# Patient Record
Sex: FEMALE | Race: WHITE | NOT HISPANIC OR LATINO | Employment: FULL TIME | URBAN - METROPOLITAN AREA
[De-identification: names, ages, dates, MRNs, and addresses within clinical notes are randomized per-mention and may not be internally consistent; named-entity substitution may affect disease eponyms.]

---

## 2021-04-13 ENCOUNTER — TELEPHONE (OUTPATIENT)
Dept: CARDIOLOGY | Facility: MEDICAL CENTER | Age: 51
End: 2021-04-13

## 2021-04-13 NOTE — TELEPHONE ENCOUNTER
Spoke w/ pt regarding upcoming appt w/ [facility] @ [facility]. Pt was admitted at [facility] and had several cardiac tests including an echo, EKG, holter monitor, chest X-ray and labs.   I called [facility] med records and verbally requested these records be sent to [facility] @ [phone].

## 2021-04-14 ENCOUNTER — OFFICE VISIT (OUTPATIENT)
Dept: CARDIOLOGY | Facility: MEDICAL CENTER | Age: 51
End: 2021-04-14
Payer: COMMERCIAL

## 2021-04-14 ENCOUNTER — TELEPHONE (OUTPATIENT)
Dept: CARDIOLOGY | Facility: MEDICAL CENTER | Age: 51
End: 2021-04-14

## 2021-04-14 VITALS
DIASTOLIC BLOOD PRESSURE: 78 MMHG | SYSTOLIC BLOOD PRESSURE: 122 MMHG | HEIGHT: 62 IN | WEIGHT: 202 LBS | HEART RATE: 108 BPM | OXYGEN SATURATION: 91 % | BODY MASS INDEX: 37.17 KG/M2

## 2021-04-14 DIAGNOSIS — I10 ESSENTIAL HYPERTENSION, BENIGN: ICD-10-CM

## 2021-04-14 DIAGNOSIS — R00.2 PALPITATIONS: ICD-10-CM

## 2021-04-14 DIAGNOSIS — F17.201 TOBACCO ABUSE, IN REMISSION: ICD-10-CM

## 2021-04-14 DIAGNOSIS — R07.9 CHEST PAIN, UNSPECIFIED TYPE: ICD-10-CM

## 2021-04-14 DIAGNOSIS — I35.1 AORTIC VALVE INSUFFICIENCY, ETIOLOGY OF CARDIAC VALVE DISEASE UNSPECIFIED: ICD-10-CM

## 2021-04-14 DIAGNOSIS — I49.3 PVC (PREMATURE VENTRICULAR CONTRACTION): ICD-10-CM

## 2021-04-14 DIAGNOSIS — Z91.89 10 YEAR RISK OF MI OR STROKE < 7.5%: ICD-10-CM

## 2021-04-14 PROBLEM — J45.50 SEVERE PERSISTENT ASTHMA WITHOUT COMPLICATION: Status: ACTIVE | Noted: 2021-04-14

## 2021-04-14 PROBLEM — J44.9 COPD (CHRONIC OBSTRUCTIVE PULMONARY DISEASE) (HCC): Status: ACTIVE | Noted: 2021-04-14

## 2021-04-14 LAB — EKG IMPRESSION: NORMAL

## 2021-04-14 PROCEDURE — 93000 ELECTROCARDIOGRAM COMPLETE: CPT | Performed by: INTERNAL MEDICINE

## 2021-04-14 PROCEDURE — 99204 OFFICE O/P NEW MOD 45 MIN: CPT | Mod: 25 | Performed by: INTERNAL MEDICINE

## 2021-04-14 RX ORDER — VENLAFAXINE HYDROCHLORIDE 37.5 MG/1
37.5 CAPSULE, EXTENDED RELEASE ORAL DAILY
COMMUNITY
Start: 2021-04-06 | End: 2022-12-21

## 2021-04-14 RX ORDER — HYDROXYZINE PAMOATE 50 MG/1
50 CAPSULE ORAL 4 TIMES DAILY PRN
COMMUNITY
Start: 2021-04-06

## 2021-04-14 RX ORDER — IPRATROPIUM BROMIDE 21 UG/1
SPRAY, METERED NASAL PRN
COMMUNITY
Start: 2021-02-05 | End: 2022-12-21

## 2021-04-14 RX ORDER — LOSARTAN POTASSIUM 50 MG/1
50 TABLET ORAL DAILY
COMMUNITY

## 2021-04-14 RX ORDER — METOPROLOL SUCCINATE 25 MG/1
25 TABLET, EXTENDED RELEASE ORAL DAILY
Qty: 30 TABLET | Refills: 3 | Status: SHIPPED | OUTPATIENT
Start: 2021-04-14 | End: 2021-08-02

## 2021-04-14 RX ORDER — ALBUTEROL SULFATE 90 UG/1
AEROSOL, METERED RESPIRATORY (INHALATION) EVERY 6 HOURS PRN
COMMUNITY
Start: 2021-03-25

## 2021-04-14 ASSESSMENT — ENCOUNTER SYMPTOMS
PALPITATIONS: 1
FEVER: 0
HEARTBURN: 0
COUGH: 0
BACK PAIN: 0
DEPRESSION: 0
CONSTIPATION: 0
FLANK PAIN: 0
DYSPNEA ON EXERTION: 0
DECREASED APPETITE: 0
NAUSEA: 0
PND: 0
WEIGHT GAIN: 0
ABDOMINAL PAIN: 0
DIZZINESS: 0
IRREGULAR HEARTBEAT: 0
NEAR-SYNCOPE: 0
SHORTNESS OF BREATH: 0
CLAUDICATION: 0
ORTHOPNEA: 0
SYNCOPE: 0
WEIGHT LOSS: 0
DIARRHEA: 0
ALTERED MENTAL STATUS: 0
VOMITING: 0
BLURRED VISION: 0

## 2021-04-14 NOTE — PROGRESS NOTES
Cardiology Note    Chief Complaint   Patient presents with   • Chest Pain   • Palpitations       History of Present Illness: Katiana Simmons is a 50 y.o. female PMH HTN, tobacco use, severe reactive airway disease and COPD on PFTs who presents for initial visit.    ED 3/3/21 visit for palpitations [facility]. Found hypertensive 180/107 and sinus tachy as well as hypoxia 90-91%. ddimer was elevated but CTA negative for PE. Improved with BP control using nitroglycerin and IV metoprolol. Troponin negative at the time. Case was discussed with cardiology Dr Davis at that point.     Now still with palpitations. Happen almost daily, but vary in frequency. Lasts about 2-3 minutes. Decreased caffeine intake. Some improvement with this change. However, was still drinking black tea. Has since went to just water and not very much difference. With palpitations will sometimes get chest pain. When presented to ED felt chest pressure, substernal. She also has exertional dyspnea. However this will come and go; some days no dyspnea. In the past, this improved with inhaler use. Remains off tobacco after successfully quitting 1 month ago. Stopped chantix now that quit.     Sister with MI in 40s s/p PCI.     Review of Systems   Constitution: Negative for decreased appetite, fever, malaise/fatigue, weight gain and weight loss.   HENT: Negative for congestion and nosebleeds.    Eyes: Negative for blurred vision.   Cardiovascular: Positive for palpitations. Negative for chest pain, claudication, dyspnea on exertion, irregular heartbeat, leg swelling, near-syncope, orthopnea, paroxysmal nocturnal dyspnea and syncope.   Respiratory: Negative for cough and shortness of breath.    Endocrine: Negative for cold intolerance and heat intolerance.   Skin: Negative for rash.   Musculoskeletal: Negative for back pain.   Gastrointestinal: Negative for abdominal pain, constipation, diarrhea, heartburn, melena, nausea and vomiting.   Genitourinary: Negative for  dysuria, flank pain and hematuria.   Neurological: Negative for dizziness.   Psychiatric/Behavioral: Negative for altered mental status and depression.         History reviewed. No pertinent past medical history.      History reviewed. No pertinent surgical history.      Current Outpatient Medications   Medication Sig Dispense Refill   • albuterol 108 (90 Base) MCG/ACT Aero Soln inhalation aerosol every 6 hours as needed.     • venlafaxine XR (EFFEXOR XR) 37.5 MG CAPSULE SR 24 HR Take 37.5 mg by mouth every day.     • hydrOXYzine pamoate (VISTARIL) 50 MG Cap Take 50 mg by mouth 4 times a day as needed.     • ipratropium (ATROVENT) 0.03 % Solution Administer  into affected nostril(S) as needed.     • losartan (COZAAR) 50 MG Tab Take 50 mg by mouth every day.     • ipratropium (ATROVENT) 0.02 % Solution Take  by nebulization.     • metoprolol SR (TOPROL XL) 25 MG TABLET SR 24 HR Take 1 tablet by mouth every day. 30 tablet 3     No current facility-administered medications for this visit.         Allergies   Allergen Reactions   • Lisinopril Unspecified     Turned red, bp increased rapidly         Family History   Problem Relation Age of Onset   • Hyperlipidemia Father    • Heart Disease Sister          Social History     Socioeconomic History   • Marital status: Single     Spouse name: Not on file   • Number of children: Not on file   • Years of education: Not on file   • Highest education level: Not on file   Occupational History   • Not on file   Tobacco Use   • Smoking status: Former Smoker     Packs/day: 1.00     Years: 20.00     Pack years: 20.00     Quit date: 3/14/2021     Years since quittin.0   • Smokeless tobacco: Never Used   • Tobacco comment: quit on and off, most recently quit 2021   Substance and Sexual Activity   • Alcohol use: Not Currently   • Drug use: Not on file   • Sexual activity: Not on file   Other Topics Concern   • Not on file   Social History Narrative   • Not on file     Social  "Determinants of Health     Financial Resource Strain:    • Difficulty of Paying Living Expenses:    Food Insecurity:    • Worried About Running Out of Food in the Last Year:    • Ran Out of Food in the Last Year:    Transportation Needs:    • Lack of Transportation (Medical):    • Lack of Transportation (Non-Medical):    Physical Activity:    • Days of Exercise per Week:    • Minutes of Exercise per Session:    Stress:    • Feeling of Stress :    Social Connections:    • Frequency of Communication with Friends and Family:    • Frequency of Social Gatherings with Friends and Family:    • Attends Baptism Services:    • Active Member of Clubs or Organizations:    • Attends Club or Organization Meetings:    • Marital Status:    Intimate Partner Violence:    • Fear of Current or Ex-Partner:    • Emotionally Abused:    • Physically Abused:    • Sexually Abused:          Physical Exam:  Ambulatory Vitals  /78 (BP Location: Left arm, Patient Position: Sitting, BP Cuff Size: Adult)   Pulse (!) 108   Ht 1.575 m (5' 2\")   Wt 91.6 kg (202 lb)   SpO2 91%    BP Readings from Last 4 Encounters:   04/14/21 122/78     Weight/BMI:   Vitals:    04/14/21 1034   BP: 122/78   Weight: 91.6 kg (202 lb)   Height: 1.575 m (5' 2\")    Body mass index is 36.95 kg/m².  Wt Readings from Last 4 Encounters:   04/14/21 91.6 kg (202 lb)       Physical Exam   Constitutional: She is oriented to person, place, and time and well-developed, well-nourished, and in no distress. No distress.   HENT:   Head: Normocephalic and atraumatic.   Eyes: Pupils are equal, round, and reactive to light. Conjunctivae are normal.   Neck: No JVD present.   Cardiovascular: Normal rate, regular rhythm, normal heart sounds and intact distal pulses. Exam reveals no gallop and no friction rub.   No murmur heard.  Pulmonary/Chest: Effort normal and breath sounds normal. No respiratory distress. She has no wheezes. She has no rales. She exhibits no tenderness. "   Abdominal: Soft. Bowel sounds are normal. She exhibits no distension.   Musculoskeletal:         General: No edema.      Cervical back: Normal range of motion and neck supple.   Neurological: She is alert and oriented to person, place, and time.   Skin: Skin is warm and dry.   Psychiatric: Affect and judgment normal.       Lab Data Review:  No results found for: CHOLSTRLTOT, LDL, HDL, TRIGLYCERIDE    No results found for: SODIUM, POTASSIUM, CHLORIDE, CO2, GLUCOSE, BUN, CREATININE, BUNCREATRAT, GLOMRATE  CrCl cannot be calculated (No successful lab value found.).  No results found for: ALKPHOSPHAT, ASTSGOT, ALTSGPT, TBILIRUBIN   No results found for: WBC, HCT  No results found for: HBA1C  No components found for: TROP    Outside labs [facility] [place]  -tot chol 173, trig 107, HDL 47,     Cardiac Imaging and Procedures Review:      EKG outside study [facility] 4/1/2021 - sinus, frequent PVCs    TTE 3/4/2021 [facility] [place] [facility]  -normal LV, normal RV  -mild to mod AR [ probably closer to moderate]  -estimated RVSP 40mmHg    holter 2/4/21 24h [facility]  -ventricular ectopy 4.9%  -rare supraventricular ectopy  -predominantly sinus rhythm    Medical Decision Making:  Problem List Items Addressed This Visit     10 year risk of MI or stroke < 7.5%    Essential hypertension, benign    Relevant Medications    losartan (COZAAR) 50 MG Tab    metoprolol SR (TOPROL XL) 25 MG TABLET SR 24 HR    Other Relevant Orders    OVERNIGHT PULSE OXIMETRY    Tobacco abuse, in remission    Palpitations    Relevant Orders    EKG (Completed)    Chest pain    Relevant Orders    EKG (Completed)    CT-CTA HEART W/3D IMAGE    PVC (premature ventricular contraction)    Relevant Medications    losartan (COZAAR) 50 MG Tab    metoprolol SR (TOPROL XL) 25 MG TABLET SR 24 HR    Other Relevant Orders    OVERNIGHT PULSE OXIMETRY    Aortic regurgitation    Relevant Medications    losartan (COZAAR) 50 MG Tab    metoprolol SR (TOPROL XL) 25 MG TABLET SR 24 HR        Chest pain /  dyspnea - unclear etiology; cardiac vs pulm. Could be related to PVCs. Attempt BB. Check CCTA evaluate coronaries. BP control. Could also be related to pulmonary etiologies asthma/copd.     PVCs / palpitations - add metoprolol. Check OPO.     AR - BP control. Thereafter will repeat TTE.    It was my pleasure to meet with Ms. Simmons.

## 2021-04-14 NOTE — PATIENT INSTRUCTIONS
Metoprolol extended-release tablets  What is this medicine?  METOPROLOL (me TOE proe lole) is a beta-blocker. Beta-blockers reduce the workload on the heart and help it to beat more regularly. This medicine is used to treat high blood pressure and to prevent chest pain. It is also used to after a heart attack and to prevent an additional heart attack from occurring.  This medicine may be used for other purposes; ask your health care provider or pharmacist if you have questions.  COMMON BRAND NAME(S): toprol, Toprol XL  What should I tell my health care provider before I take this medicine?  They need to know if you have any of these conditions:  · diabetes  · heart or vessel disease like slow heart rate, worsening heart failure, heart block, sick sinus syndrome or Raynaud's disease  · kidney disease  · liver disease  · lung or breathing disease, like asthma or emphysema  · pheochromocytoma  · thyroid disease  · an unusual or allergic reaction to metoprolol, other beta-blockers, medicines, foods, dyes, or preservatives  · pregnant or trying to get pregnant  · breast-feeding  How should I use this medicine?  Take this medicine by mouth with a glass of water. Follow the directions on the prescription label. Do not crush or chew. Take this medicine with or immediately after meals. Take your doses at regular intervals. Do not take more medicine than directed. Do not stop taking this medicine suddenly. This could lead to serious heart-related effects.  Talk to your pediatrician regarding the use of this medicine in children. While this drug may be prescribed for children as young as 6 years for selected conditions, precautions do apply.  Overdosage: If you think you have taken too much of this medicine contact a poison control center or emergency room at once.  NOTE: This medicine is only for you. Do not share this medicine with others.  What if I miss a dose?  If you miss a dose, take it as soon as you can. If it is  almost time for your next dose, take only that dose. Do not take double or extra doses.  What may interact with this medicine?  This medicine may interact with the following medications:  · certain medicines for blood pressure, heart disease, irregular heart beat  · certain medicines for depression, like monoamine oxidase (MAO) inhibitors, fluoxetine, or paroxetine  · clonidine  · dobutamine  · epinephrine  · isoproterenol  · reserpine  This list may not describe all possible interactions. Give your health care provider a list of all the medicines, herbs, non-prescription drugs, or dietary supplements you use. Also tell them if you smoke, drink alcohol, or use illegal drugs. Some items may interact with your medicine.  What should I watch for while using this medicine?  Visit your doctor or health care professional for regular check ups. Contact your doctor right away if your symptoms worsen. Check your blood pressure and pulse rate regularly. Ask your health care professional what your blood pressure and pulse rate should be, and when you should contact them.  You may get drowsy or dizzy. Do not drive, use machinery, or do anything that needs mental alertness until you know how this medicine affects you. Do not sit or stand up quickly, especially if you are an older patient. This reduces the risk of dizzy or fainting spells. Contact your doctor if these symptoms continue. Alcohol may interfere with the effect of this medicine. Avoid alcoholic drinks.  This medicine may increase blood sugar. Ask your healthcare provider if changes in diet or medicines are needed if you have diabetes.  What side effects may I notice from receiving this medicine?  Side effects that you should report to your doctor or health care professional as soon as possible:  · allergic reactions like skin rash, itching or hives  · cold or numb hands or feet  · depression  · difficulty breathing  · faint  · fever with sore throat  · irregular  "heartbeat, chest pain  · rapid weight gain  ·   signs and symptoms of high blood sugar such as being more thirsty or hungry or having to urinate more than normal. You may also feel very tired or have blurry vision.  · swollen legs or ankles  Side effects that usually do not require medical attention (report to your doctor or health care professional if they continue or are bothersome):  · anxiety or nervousness  · change in sex drive or performance  · dry skin  · headache  · nightmares or trouble sleeping  · short term memory loss  · stomach upset or diarrhea  This list may not describe all possible side effects. Call your doctor for medical advice about side effects. You may report side effects to FDA at 6-073-HKR-0474.  Where should I keep my medicine?  Keep out of the reach of children.  Store at room temperature between 15 and 30 degrees C (59 and 86 degrees F). Throw away any unused medicine after the expiration date.  NOTE: This sheet is a summary. It may not cover all possible information. If you have questions about this medicine, talk to your doctor, pharmacist, or health care provider.  © 2020 Elsevier/Gold Standard (2019-10-08 11:09:41)      Hypertension, Adult  High blood pressure (hypertension) is when the force of blood pumping through the arteries is too strong. The arteries are the blood vessels that carry blood from the heart throughout the body. Hypertension forces the heart to work harder to pump blood and may cause arteries to become narrow or stiff. Untreated or uncontrolled hypertension can cause a heart attack, heart failure, a stroke, kidney disease, and other problems.  A blood pressure reading consists of a higher number over a lower number. Ideally, your blood pressure should be below 120/80. The first (\"top\") number is called the systolic pressure. It is a measure of the pressure in your arteries as your heart beats. The second (\"bottom\") number is called the diastolic pressure. It is a " measure of the pressure in your arteries as the heart relaxes.  What are the causes?  The exact cause of this condition is not known. There are some conditions that result in or are related to high blood pressure.  What increases the risk?  Some risk factors for high blood pressure are under your control. The following factors may make you more likely to develop this condition:  · Smoking.  · Having type 2 diabetes mellitus, high cholesterol, or both.  · Not getting enough exercise or physical activity.  · Being overweight.  · Having too much fat, sugar, calories, or salt (sodium) in your diet.  · Drinking too much alcohol.  Some risk factors for high blood pressure may be difficult or impossible to change. Some of these factors include:  · Having chronic kidney disease.  · Having a family history of high blood pressure.  · Age. Risk increases with age.  · Race. You may be at higher risk if you are .  · Gender. Men are at higher risk than women before age 45. After age 65, women are at higher risk than men.  · Having obstructive sleep apnea.  · Stress.  What are the signs or symptoms?  High blood pressure may not cause symptoms. Very high blood pressure (hypertensive crisis) may cause:  · Headache.  · Anxiety.  · Shortness of breath.  · Nosebleed.  · Nausea and vomiting.  · Vision changes.  · Severe chest pain.  · Seizures.  How is this diagnosed?  This condition is diagnosed by measuring your blood pressure while you are seated, with your arm resting on a flat surface, your legs uncrossed, and your feet flat on the floor. The cuff of the blood pressure monitor will be placed directly against the skin of your upper arm at the level of your heart. It should be measured at least twice using the same arm. Certain conditions can cause a difference in blood pressure between your right and left arms.  Certain factors can cause blood pressure readings to be lower or higher than normal for a short period of  time:  · When your blood pressure is higher when you are in a health care provider's office than when you are at home, this is called white coat hypertension. Most people with this condition do not need medicines.  · When your blood pressure is higher at home than when you are in a health care provider's office, this is called masked hypertension. Most people with this condition may need medicines to control blood pressure.  If you have a high blood pressure reading during one visit or you have normal blood pressure with other risk factors, you may be asked to:  · Return on a different day to have your blood pressure checked again.  · Monitor your blood pressure at home for 1 week or longer.  If you are diagnosed with hypertension, you may have other blood or imaging tests to help your health care provider understand your overall risk for other conditions.  How is this treated?  This condition is treated by making healthy lifestyle changes, such as eating healthy foods, exercising more, and reducing your alcohol intake. Your health care provider may prescribe medicine if lifestyle changes are not enough to get your blood pressure under control, and if:  · Your systolic blood pressure is above 130.  · Your diastolic blood pressure is above 80.  Your personal target blood pressure may vary depending on your medical conditions, your age, and other factors.  Follow these instructions at home:  Eating and drinking    · Eat a diet that is high in fiber and potassium, and low in sodium, added sugar, and fat. An example eating plan is called the DASH (Dietary Approaches to Stop Hypertension) diet. To eat this way:  ? Eat plenty of fresh fruits and vegetables. Try to fill one half of your plate at each meal with fruits and vegetables.  ? Eat whole grains, such as whole-wheat pasta, brown rice, or whole-grain bread. Fill about one fourth of your plate with whole grains.  ? Eat or drink low-fat dairy products, such as skim  milk or low-fat yogurt.  ? Avoid fatty cuts of meat, processed or cured meats, and poultry with skin. Fill about one fourth of your plate with lean proteins, such as fish, chicken without skin, beans, eggs, or tofu.  ? Avoid pre-made and processed foods. These tend to be higher in sodium, added sugar, and fat.  · Reduce your daily sodium intake. Most people with hypertension should eat less than 1,500 mg of sodium a day.  · Do not drink alcohol if:  ? Your health care provider tells you not to drink.  ? You are pregnant, may be pregnant, or are planning to become pregnant.  · If you drink alcohol:  ? Limit how much you use to:  § 0-1 drink a day for women.  § 0-2 drinks a day for men.  ? Be aware of how much alcohol is in your drink. In the [place] one drink equals one 12 oz bottle of beer (355 mL), one 5 oz glass of wine (148 mL), or one 1½ oz glass of hard liquor (44 mL).  Lifestyle    · Work with your health care provider to maintain a healthy body weight or to lose weight. Ask what an ideal weight is for you.  · Get at least 30 minutes of exercise most days of the week. Activities may include walking, swimming, or biking.  · Include exercise to strengthen your muscles (resistance exercise), such as Pilates or lifting weights, as part of your weekly exercise routine. Try to do these types of exercises for 30 minutes at least 3 days a week.  · Do not use any products that contain nicotine or tobacco, such as cigarettes, e-cigarettes, and chewing tobacco. If you need help quitting, ask your health care provider.  · Monitor your blood pressure at home as told by your health care provider.  · Keep all follow-up visits as told by your health care provider. This is important.  Medicines  · Take over-the-counter and prescription medicines only as told by your health care provider. Follow directions carefully. Blood pressure medicines must be taken as prescribed.  · Do not skip doses of blood pressure medicine. Doing this  puts you at risk for problems and can make the medicine less effective.  · Ask your health care provider about side effects or reactions to medicines that you should watch for.  Contact a health care provider if you:  · Think you are having a reaction to a medicine you are taking.  · Have headaches that keep coming back (recurring).  · Feel dizzy.  · Have swelling in your ankles.  · Have trouble with your vision.  Get help right away if you:  · Develop a severe headache or confusion.  · Have unusual weakness or numbness.  · Feel faint.  · Have severe pain in your chest or abdomen.  · Vomit repeatedly.  · Have trouble breathing.  Summary  · Hypertension is when the force of blood pumping through your arteries is too strong. If this condition is not controlled, it may put you at risk for serious complications.  · Your personal target blood pressure may vary depending on your medical conditions, your age, and other factors. For most people, a normal blood pressure is less than 120/80.  · Hypertension is treated with lifestyle changes, medicines, or a combination of both. Lifestyle changes include losing weight, eating a healthy, low-sodium diet, exercising more, and limiting alcohol.  This information is not intended to replace advice given to you by your health care provider. Make sure you discuss any questions you have with your health care provider.  Document Released: 12/18/2006 Document Revised: 08/28/2019 Document Reviewed: 08/28/2019  Elsevier Patient Education © 2020 Elsevier Inc.

## 2021-04-14 NOTE — TELEPHONE ENCOUNTER
OPO order per [facility]     Faxed to [facility] w/ faceshet ID/INS cards & office visit note.   T: [phone]  F: [phone]  INS:[facility]    Fax confirmed-sent to scanning.

## 2021-05-07 ENCOUNTER — TELEPHONE (OUTPATIENT)
Dept: CARDIOLOGY | Facility: MEDICAL CENTER | Age: 51
End: 2021-05-07

## 2021-05-07 NOTE — TELEPHONE ENCOUNTER
Sav Freedman M.D.   5/6/2021 10:24 PM PDT      Significant desaturation events. Amarilis, would you please forward to PMD Dr Salguero? Not on mychart. Thank you!     Called pt. She explains that she actually already completed an OPO for her PCP Dr. Salguero not too long prior to the OPO she did for [facility]. She assumed the reason for the repeat test was because the OPO for her PCP shut off [facility] through the night. She thought we wanted her to redo it so we had the full night's results. Her PCP already prescribed her nocturnal oxygen based on the first OPO results, which she started a couple days before the OPO for [facility]. She didn't wear the O2 during the OPO, though. Apologized for the miscommunication, and faxed the updated OPO results to her PCP at [phone]. Receipt confirmed.

## 2021-07-28 ENCOUNTER — PATIENT MESSAGE (OUTPATIENT)
Dept: CARDIOLOGY | Facility: MEDICAL CENTER | Age: 51
End: 2021-07-28

## 2021-07-29 NOTE — PATIENT COMMUNICATION
Teams message to JAQUELIN Kee. Recommendations are to hold albuterol prior to CCTA if possible due to the possibility of increased HR. Pt should bring it with her in case she needs it after. No contraindication to ipratropium.

## 2021-08-09 ENCOUNTER — HOSPITAL ENCOUNTER (OUTPATIENT)
Dept: RADIOLOGY | Facility: MEDICAL CENTER | Age: 51
End: 2021-08-09
Attending: INTERNAL MEDICINE
Payer: COMMERCIAL

## 2021-08-09 DIAGNOSIS — R07.9 CHEST PAIN, UNSPECIFIED TYPE: ICD-10-CM

## 2021-08-09 PROCEDURE — A9270 NON-COVERED ITEM OR SERVICE: HCPCS | Performed by: RADIOLOGY

## 2021-08-09 PROCEDURE — 75574 CT ANGIO HRT W/3D IMAGE: CPT

## 2021-08-09 PROCEDURE — 700117 HCHG RX CONTRAST REV CODE 255: Performed by: INTERNAL MEDICINE

## 2021-08-09 PROCEDURE — 700102 HCHG RX REV CODE 250 W/ 637 OVERRIDE(OP): Performed by: RADIOLOGY

## 2021-08-09 RX ORDER — NITROGLYCERIN 0.4 MG/1
0.4 TABLET SUBLINGUAL
Status: DISCONTINUED | OUTPATIENT
Start: 2021-08-09 | End: 2021-08-10 | Stop reason: HOSPADM

## 2021-08-09 RX ADMIN — IOHEXOL 60 ML: 350 INJECTION, SOLUTION INTRAVENOUS at 10:47

## 2021-08-09 RX ADMIN — NITROGLYCERIN 0.4 MG: 0.4 TABLET, ORALLY DISINTEGRATING SUBLINGUAL at 10:13

## 2021-08-09 NOTE — PROGRESS NOTES
CTA EXAMINATION:      Patient had CTA.  Patient brought to preparation room.   Verbal consent given by patient for PIV and administration of NTG medications by RN.  PIV initiated, 20 L AC  Review of medications, protocol, and NPO status.   Education provided to patient regarding examination.    Patient given baseline 3 lead EK  HR: SR 77 w/PVCs  BP: 146/102  RR 20  96% RA     Patient ready for CT scan  Vitals:1005  BP:138/89   HR: SR 85 w/PVCs  RR22  96% RA    Administration of Sublingual NTG given per CTA protocol at 1013--See MAR  Vitals: 1013  BP:129/73  HR: SR 89  w/PVCs  RR 22   96% RA    Vitals: 1025  BP:122/64  HR: SR 94 w/PVCs  RR20  93% RA    Patient returned to preparation area where post procedure education given. PIV removed, patient provided 2 bottles of water.     Vitals returned to baseline.. Patient stated ready to go back. Discharge education provided. Discharged per protocol.     Patient ambulated self, escorted by RN to [facility].

## 2021-08-12 ENCOUNTER — OFFICE VISIT (OUTPATIENT)
Dept: CARDIOLOGY | Facility: MEDICAL CENTER | Age: 51
End: 2021-08-12
Payer: COMMERCIAL

## 2021-08-12 VITALS
HEART RATE: 96 BPM | RESPIRATION RATE: 16 BRPM | OXYGEN SATURATION: 94 % | WEIGHT: 213.7 LBS | BODY MASS INDEX: 39.32 KG/M2 | DIASTOLIC BLOOD PRESSURE: 72 MMHG | SYSTOLIC BLOOD PRESSURE: 110 MMHG | HEIGHT: 62 IN

## 2021-08-12 DIAGNOSIS — I10 ESSENTIAL HYPERTENSION, BENIGN: ICD-10-CM

## 2021-08-12 DIAGNOSIS — Z91.89 10 YEAR RISK OF MI OR STROKE < 7.5%: ICD-10-CM

## 2021-08-12 DIAGNOSIS — I35.1 AORTIC VALVE INSUFFICIENCY, ETIOLOGY OF CARDIAC VALVE DISEASE UNSPECIFIED: ICD-10-CM

## 2021-08-12 DIAGNOSIS — J44.9 CHRONIC OBSTRUCTIVE PULMONARY DISEASE, UNSPECIFIED COPD TYPE (HCC): ICD-10-CM

## 2021-08-12 DIAGNOSIS — R00.2 PALPITATIONS: ICD-10-CM

## 2021-08-12 PROCEDURE — 99214 OFFICE O/P EST MOD 30 MIN: CPT | Performed by: INTERNAL MEDICINE

## 2021-08-12 RX ORDER — TIOTROPIUM BROMIDE INHALATION SPRAY 1.56 UG/1
SPRAY, METERED RESPIRATORY (INHALATION)
COMMUNITY
Start: 2021-08-05 | End: 2022-12-21

## 2021-08-12 ASSESSMENT — ENCOUNTER SYMPTOMS
PALPITATIONS: 1
COUGH: 0
FLANK PAIN: 0
WEIGHT LOSS: 0
DIZZINESS: 0
BLURRED VISION: 0
HEARTBURN: 0
PND: 0
NAUSEA: 0
WEIGHT GAIN: 0
CLAUDICATION: 0
VOMITING: 0
NEAR-SYNCOPE: 0
ORTHOPNEA: 0
FEVER: 0
DEPRESSION: 0
DIARRHEA: 0
DYSPNEA ON EXERTION: 0
ABDOMINAL PAIN: 0
IRREGULAR HEARTBEAT: 0
DECREASED APPETITE: 0
SHORTNESS OF BREATH: 0
SYNCOPE: 0
ALTERED MENTAL STATUS: 0
BACK PAIN: 0
CONSTIPATION: 0

## 2021-08-12 NOTE — PROGRESS NOTES
Cardiology Note    Chief Complaint   Patient presents with   • Hypertension   • Palpitations       History of Present Illness: Katiana Simmons is a 50 y.o. female PMH HTN, tobacco use, severe reactive airway disease and COPD on PFTs who presents for initial visit.    Diagnosed with copd around 4/2021 she states. Doesn't have pulmonologist yet. Underwent PFTs at [facility]. Found sleep apnea. Pending follow up with sleep medicine. Metoprolol significantly reduced palpitations. Adequate control at 25mg and doesn't want to increase at this time. Continues to refrain from tobacco. Has weight loss plan and is interested in intermittent fasting. She will ask for nutrition referral if needs help.    ED 3/3/21 visit for palpitations [facility]. Found hypertensive 180/107 and sinus tachy as well as hypoxia 90-91%. ddimer was elevated but CTA negative for PE. Improved with BP control using nitroglycerin and IV metoprolol. Troponin negative at the time. Case was discussed with cardiology Dr Davis at that point.     Review of Systems   Constitutional: Negative for decreased appetite, fever, malaise/fatigue, weight gain and weight loss.   HENT: Negative for congestion and nosebleeds.    Eyes: Negative for blurred vision.   Cardiovascular: Positive for palpitations. Negative for chest pain, claudication, dyspnea on exertion, irregular heartbeat, leg swelling, near-syncope, orthopnea, paroxysmal nocturnal dyspnea and syncope.   Respiratory: Negative for cough and shortness of breath.    Endocrine: Negative for cold intolerance and heat intolerance.   Skin: Negative for rash.   Musculoskeletal: Negative for back pain.   Gastrointestinal: Negative for abdominal pain, constipation, diarrhea, heartburn, melena, nausea and vomiting.   Genitourinary: Negative for dysuria, flank pain and hematuria.   Neurological: Negative for dizziness.   Psychiatric/Behavioral: Negative for altered mental status and depression.         History reviewed. No  pertinent past medical history.      History reviewed. No pertinent surgical history.      Current Outpatient Medications   Medication Sig Dispense Refill   • SPIRIVA RESPIMAT 1.25 MCG/ACT Aero Soln Inhale 2 puffs every day by inhalation route.     • metoprolol SR (TOPROL XL) 25 MG TABLET SR 24 HR Take 1 tablet by mouth every day. 90 tablet 2   • albuterol 108 (90 Base) MCG/ACT Aero Soln inhalation aerosol every 6 hours as needed.     • venlafaxine XR (EFFEXOR XR) 37.5 MG CAPSULE SR 24 HR Take 37.5 mg by mouth every day.     • hydrOXYzine pamoate (VISTARIL) 50 MG Cap Take 50 mg by mouth 4 times a day as needed.     • ipratropium (ATROVENT) 0.03 % Solution Administer  into affected nostril(S) as needed.     • losartan (COZAAR) 50 MG Tab Take 50 mg by mouth every day.     • ipratropium (ATROVENT) 0.02 % Solution Take  by nebulization.       No current facility-administered medications for this visit.         Allergies   Allergen Reactions   • Lisinopril Unspecified     Turned red, bp increased rapidly         Family History   Problem Relation Age of Onset   • Hyperlipidemia Father    • Heart Disease Sister          Social History     Socioeconomic History   • Marital status: Single     Spouse name: Not on file   • Number of children: Not on file   • Years of education: Not on file   • Highest education level: Not on file   Occupational History   • Not on file   Tobacco Use   • Smoking status: Former Smoker     Packs/day: 1.00     Years: 20.00     Pack years: 20.00     Quit date: 3/14/2021     Years since quittin.4   • Smokeless tobacco: Never Used   • Tobacco comment: quit on and off, most recently quit 2021   Substance and Sexual Activity   • Alcohol use: Not Currently   • Drug use: Not on file   • Sexual activity: Not on file   Other Topics Concern   • Not on file   Social History Narrative   • Not on file     Social Determinants of Health     Financial Resource Strain:    • Difficulty of Paying Living  "Expenses:    Food Insecurity:    • Worried About Running Out of Food in the Last Year:    • Ran Out of Food in the Last Year:    Transportation Needs:    • Lack of Transportation (Medical):    • Lack of Transportation (Non-Medical):    Physical Activity:    • Days of Exercise per Week:    • Minutes of Exercise per Session:    Stress:    • Feeling of Stress :    Social Connections:    • Frequency of Communication with Friends and Family:    • Frequency of Social Gatherings with Friends and Family:    • Attends Baptism Services:    • Active Member of Clubs or Organizations:    • Attends Club or Organization Meetings:    • Marital Status:    Intimate Partner Violence:    • Fear of Current or Ex-Partner:    • Emotionally Abused:    • Physically Abused:    • Sexually Abused:          Physical Exam:  Ambulatory Vitals  /72 (BP Location: Left arm, Patient Position: Sitting, BP Cuff Size: Adult)   Pulse 96   Resp 16   Ht 1.575 m (5' 2\")   Wt 96.9 kg (213 lb 11.2 oz)   SpO2 94%    BP Readings from Last 4 Encounters:   08/12/21 110/72   04/14/21 122/78     Weight/BMI:   Vitals:    08/12/21 1525   BP: 110/72   Weight: 96.9 kg (213 lb 11.2 oz)   Height: 1.575 m (5' 2\")    Body mass index is 39.09 kg/m².  Wt Readings from Last 4 Encounters:   08/12/21 96.9 kg (213 lb 11.2 oz)   04/14/21 91.6 kg (202 lb)       Physical Exam  Constitutional:       General: She is not in acute distress.  HENT:      Head: Normocephalic and atraumatic.   Eyes:      Conjunctiva/sclera: Conjunctivae normal.      Pupils: Pupils are equal, round, and reactive to light.   Neck:      Vascular: No JVD.   Cardiovascular:      Rate and Rhythm: Normal rate and regular rhythm.      Heart sounds: Normal heart sounds. No murmur heard.   No friction rub. No gallop.    Pulmonary:      Effort: Pulmonary effort is normal. No respiratory distress.      Breath sounds: Normal breath sounds. No wheezing or rales.   Chest:      Chest wall: No tenderness. "   Abdominal:      General: Bowel sounds are normal. There is no distension.      Palpations: Abdomen is soft.   Musculoskeletal:      Cervical back: Normal range of motion and neck supple.   Skin:     General: Skin is warm and dry.   Neurological:      Mental Status: She is alert and oriented to person, place, and time.   Psychiatric:         Mood and Affect: Affect normal.         Judgment: Judgment normal.         Lab Data Review:  No results found for: CHOLSTRLTOT, LDL, HDL, TRIGLYCERIDE    No results found for: SODIUM, POTASSIUM, CHLORIDE, CO2, GLUCOSE, BUN, CREATININE, BUNCREATRAT, GLOMRATE  CrCl cannot be calculated (No successful lab value found.).  No results found for: ALKPHOSPHAT, ASTSGOT, ALTSGPT, TBILIRUBIN   No results found for: WBC, HCT  No results found for: HBA1C  No components found for: TROP    [place] [facility]  [place] chol 173, trig 107, HDL 47,     Cardiac Imaging and Procedures Review:      [place] [facility] [place] - sinus, frequent PVCs    TTE 3/4/20[place] [facility]  [place]normal LV, normal RV  -mild to mod AR [ probably closer to moderate]  -estimated RVSP 40mmHg    holter 2/4/21 24h [facility]  -ventricular ectopy 4.9%  -rare supraventricular ectopy  -predominantly sinus rhythm    CCTA 8/9/21  Coronary calcification:  LMA - 0.0  LCX - 0.0  LAD - 0.0  RCA - 0.0  PDA - 0.0  Total Calcium Score: 0.0  Percentile: Calcium score is below the 25th percentile for the patient's age and sex.  Coronary CTA:  Dominance: Right dominant circulation.  Left Main: Large caliber vessel with a normal takeoff from the left coronary cusp that bifurcates to form a left anterior descending artery and a left circumflex artery. No plaque or stenosis.  LAD and Diagonals: Patent. No plaque or stenosis. No ramus intermedius.  LCX and Obtuse Marginals: Patent. No plaque or stenosis.  RCA, Posterior Descending and Posterolateral Branches: Patent. No plaque or stenosis.  Cardiac Structure and Morphology:  Left atrium:  Normal in size. No thrombus in the left atrial appendage.  Left ventricle: Normal in size. No stigmata of prior infarction. No abnormal filling defect.  Pericardium: Normal thickness. No pericardial effusion or calcification.  Cardiac valves: No thickening or calcifications in the aortic or mitral valves.  Aorta: 3.9 cm ascending aortic ectasia. No aneurysm.  1. Calcium score of 0.  2. No evidence of coronary plaque or coronary artery stenosis.  3. A 3.9 cm ascending aortic ectasia.  4. Scattered areas of atelectasis/scarring throughout the lungs.    Medical Decision Making:  Problem List Items Addressed This Visit     10 year risk of MI or stroke < 7.5%    Essential hypertension, benign    COPD (chronic obstructive pulmonary disease) (HCC)    Relevant Medications    SPIRIVA RESPIMAT 1.25 MCG/ACT Aero Soln    Other Relevant Orders    REFERRAL TO PULMONARY AND SLEEP MEDICINE    Palpitations    Aortic regurgitation    Relevant Orders    EC-ECHOCARDIOGRAM LTD W/O CONT        Dyspnea / copd - normal cardiac testing with 0 coronary calcium. Refer to pulmonary.     PVCs / palpitations - add metoprolol. Follow up with sleep medicine for LEDA.    AR - repeat TTE now that BP controlled.    It was my pleasure to meet with Ms. Simmons.

## 2021-08-12 NOTE — PATIENT INSTRUCTIONS

## 2021-10-26 DIAGNOSIS — I35.1 AORTIC VALVE INSUFFICIENCY, ETIOLOGY OF CARDIAC VALVE DISEASE UNSPECIFIED: ICD-10-CM

## 2022-04-02 ASSESSMENT — ENCOUNTER SYMPTOMS
SHORTNESS OF BREATH: 1
WHEEZING: 1
RESPIRATORY SYMPTOMS COMMENTS: YES
DYSPNEA AT REST: 1
HEMOPTYSIS: 0
CHEST TIGHTNESS: 0

## 2022-04-04 ENCOUNTER — HOSPITAL ENCOUNTER (OUTPATIENT)
Dept: RADIOLOGY | Facility: MEDICAL CENTER | Age: 52
End: 2022-04-04
Payer: COMMERCIAL

## 2022-04-05 ENCOUNTER — OFFICE VISIT (OUTPATIENT)
Dept: SLEEP MEDICINE | Facility: MEDICAL CENTER | Age: 52
End: 2022-04-05
Payer: COMMERCIAL

## 2022-04-05 VITALS
BODY MASS INDEX: 44.01 KG/M2 | WEIGHT: 239.13 LBS | DIASTOLIC BLOOD PRESSURE: 74 MMHG | OXYGEN SATURATION: 93 % | HEART RATE: 91 BPM | SYSTOLIC BLOOD PRESSURE: 110 MMHG | HEIGHT: 62 IN

## 2022-04-05 DIAGNOSIS — R09.02 HYPOXEMIA: ICD-10-CM

## 2022-04-05 DIAGNOSIS — G47.33 OSA (OBSTRUCTIVE SLEEP APNEA): ICD-10-CM

## 2022-04-05 DIAGNOSIS — R06.02 SOB (SHORTNESS OF BREATH): ICD-10-CM

## 2022-04-05 DIAGNOSIS — R63.8 INCREASED BMI: ICD-10-CM

## 2022-04-05 DIAGNOSIS — J44.89 ASTHMA-COPD OVERLAP SYNDROME (HCC): ICD-10-CM

## 2022-04-05 PROCEDURE — 99204 OFFICE O/P NEW MOD 45 MIN: CPT | Performed by: INTERNAL MEDICINE

## 2022-04-05 ASSESSMENT — PATIENT HEALTH QUESTIONNAIRE - PHQ9: CLINICAL INTERPRETATION OF PHQ2 SCORE: 0

## 2022-04-07 NOTE — PROGRESS NOTES
Chief Complaint   Patient presents with   • Establish Care     Referred by Dr Freedman for COPD   • Other     CTA-Chest 03/30/21, CXR 10/2022, echo 10/26/21, OPO 04/28/21       HPI:  The patient is a 51-year-old woman referred from cardiology for evaluation of probable COPD.  The patient smoked about 1 pack of cigarettes per day for 20 years.  She said she smoked on and off.  She finally quit smoking in March 2021.  Patient has chronic shortness of breath with significant activity.  She does not complain of a chronic cough or sputum production.  She has never had hemoptysis.  She does admit to wheezing several times per week.  She has been living in [place] for the past several years and while there she was bothered with seasonal allergies.  She had no history of allergies as a child.  An allergy referral is pending.  She tells me that albuterol has helped her when she gets short of breath and helps her to exercise.  She has been using Trelegy which helps and also uses a nebulizer twice daily.  She had spirometry performed at [facility] demonstrating an FEV1 of 0.84 l which was 32% of predicted.  Her FEV1 FVC ratio was 53%.  After a bronchodilator was administered her FEV1 improved to 1.03 L which was 42% of predicted.  Her flow volume loop seem to indicate good effort.  A chest CT performed at [facility] demonstrated some left linear atelectasis but was otherwise unremarkable.  There was no evidence of pulmonary embolism.  An echocardiogram showed no evidence of right ventricular dysfunction and no evidence of pulmonary hypertension.  Left ventricular function was normal.  Overnight oximetry did show hypoxemia which led to a sleep study.  The patient is now on CPAP at night and is tolerating the CPAP.  She was seen in the emergency room several times for hypertension which is now controlled with losartan and metoprolol.    Past Medical History:   Diagnosis Date   • Asthma    • Bronchitis    • Chickenpox     • Difficulty swallowing    • Hypertension    • Palpitations    • Shortness of breath    • Sleep apnea    • Tonsillitis    • Wears glasses    • Wheezing        ROS:   Constitutional: Denies fevers, chills, night sweats, fatigue or weight loss  Eyes: Denies vision loss, pain, drainage, double vision  Ears, Nose, Throat: Denies earache, tinnitus, hoarseness  Cardiovascular: Denies chest pain, tightness, palpitations at this time  Respiratory: See HPI  Sleep: She is on CPAP  GI: Denies abdominal pain, nausea, vomiting, diarrhea  : Denies frequent urination, hematuria, painful urination  Musculoskeletal: Denies back pain, painful joints, sore muscles  Neurological: Denies headaches, seizures  Skin: Denies rashes, color changes  Psychiatric: Denies depression or thoughts of suicide  Hematologic: Denies bleeding tendency or clotting tendency  Allergic/Immunologic: Denies rhinitis, skin sensitivity    Social History     Socioeconomic History   • Marital status: Single     Spouse name: Not on file   • Number of children: Not on file   • Years of education: Not on file   • Highest education level: Not on file   Occupational History   • Not on file   Tobacco Use   • Smoking status: Former Smoker     Packs/day: 1.00     Years: 20.00     Pack years: 20.00     Types: Cigarettes     Quit date: 3/14/2021     Years since quittin.0   • Smokeless tobacco: Never Used   • Tobacco comment: quit on and off, most recently quit 2021   Vaping Use   • Vaping Use: Former   Substance and Sexual Activity   • Alcohol use: Not Currently     Alcohol/week: 0.0 oz   • Drug use: Never   • Sexual activity: Not on file   Other Topics Concern   • Not on file   Social History Narrative   • Not on file     Social Determinants of Health     Financial Resource Strain: Not on file   Food Insecurity: Not on file   Transportation Needs: Not on file   Physical Activity: Not on file   Stress: Not on file   Social Connections: Not on file   Intimate  "Partner Violence: Not on file   Housing Stability: Not on file     Lisinopril  Current Outpatient Medications on File Prior to Visit   Medication Sig Dispense Refill   • TRELEGY ELLIPTA 100-62.5-25 MCG/INH AEROSOL POWDER, BREATH ACTIVATED inhalation INHALE ONE PUFF BY MOUTH ONE TIME DAILY     • metoprolol SR (TOPROL XL) 25 MG TABLET SR 24 HR Take 1 tablet by mouth every day. 90 tablet 2   • albuterol 108 (90 Base) MCG/ACT Aero Soln inhalation aerosol every 6 hours as needed.     • hydrOXYzine pamoate (VISTARIL) 50 MG Cap Take 50 mg by mouth 4 times a day as needed.     • ipratropium (ATROVENT) 0.03 % Solution Administer  into affected nostril(S) as needed.     • losartan (COZAAR) 50 MG Tab Take 50 mg by mouth every day.     • ipratropium (ATROVENT) 0.02 % Solution Take  by nebulization.     • SPIRIVA RESPIMAT 1.25 MCG/ACT Aero Soln Inhale 2 puffs every day by inhalation route.     • venlafaxine XR (EFFEXOR XR) 37.5 MG CAPSULE SR 24 HR Take 37.5 mg by mouth every day.       No current facility-administered medications on file prior to visit.     /74 (BP Location: Right arm, Patient Position: Sitting, BP Cuff Size: Large adult)   Pulse 91   Ht 1.575 m (5' 2\")   Wt 108 kg (239 lb 2 oz)   SpO2 93%   Family History   Problem Relation Age of Onset   • Hyperlipidemia Father    • Respiratory Disease Father    • Heart Disease Sister    • Alzheimer's Disease Paternal Grandmother    • Hypertension Mother        Physical Exam:  BMI 43.74  HEENT: PERRLA, EOMI, no scleral icterus, no nasal or oral lesions  Neck: No thyromegaly, no adenopathy, no bruits  Mallampatti: Grade II  Lungs: Equal breath sounds, no wheezes or crackles  Heart: Regular rate and rhythm, no gallops or murmurs  Abdomen: Soft, benign, no organomegaly  Extremities: No clubbing, cyanosis, or edema  Neurologic: Cranial nerve, motor, and sensory exam are normal    1. Asthma-COPD overlap syndrome (HCC)    2. SOB (shortness of breath)    3. LEDA (obstructive " sleep apnea)    4. Hypoxemia    5. Increased BMI        At this point her obstructive lung disease is appropriately treated with Trelegy and albuterol.  She has discontinued smoking.  She is now on airway pressurization therapy at night.  Her blood pressure is controlled.  Her health has significantly improved over the past year.  Clinically she appears to have better pulmonary function than her previous spirometry.  We will bring her back in several months for repeat evaluation and at that time perform full pulmonary function testing.

## 2022-08-10 ENCOUNTER — OFFICE VISIT (OUTPATIENT)
Dept: SLEEP MEDICINE | Facility: MEDICAL CENTER | Age: 52
End: 2022-08-10
Payer: COMMERCIAL

## 2022-08-10 ENCOUNTER — NON-PROVIDER VISIT (OUTPATIENT)
Dept: SLEEP MEDICINE | Facility: MEDICAL CENTER | Age: 52
End: 2022-08-10
Attending: INTERNAL MEDICINE
Payer: COMMERCIAL

## 2022-08-10 VITALS
DIASTOLIC BLOOD PRESSURE: 74 MMHG | HEIGHT: 62 IN | HEART RATE: 72 BPM | SYSTOLIC BLOOD PRESSURE: 126 MMHG | BODY MASS INDEX: 44.9 KG/M2 | OXYGEN SATURATION: 92 % | WEIGHT: 244 LBS

## 2022-08-10 VITALS — WEIGHT: 244 LBS | BODY MASS INDEX: 44.9 KG/M2 | HEIGHT: 62 IN

## 2022-08-10 DIAGNOSIS — G47.33 OSA (OBSTRUCTIVE SLEEP APNEA): ICD-10-CM

## 2022-08-10 DIAGNOSIS — R09.02 HYPOXEMIA: ICD-10-CM

## 2022-08-10 DIAGNOSIS — R06.02 SOB (SHORTNESS OF BREATH): ICD-10-CM

## 2022-08-10 DIAGNOSIS — J44.89 ASTHMA-COPD OVERLAP SYNDROME (HCC): ICD-10-CM

## 2022-08-10 DIAGNOSIS — R63.8 INCREASED BMI: ICD-10-CM

## 2022-08-10 PROCEDURE — 94060 EVALUATION OF WHEEZING: CPT | Performed by: INTERNAL MEDICINE

## 2022-08-10 PROCEDURE — 94729 DIFFUSING CAPACITY: CPT | Performed by: INTERNAL MEDICINE

## 2022-08-10 PROCEDURE — 99214 OFFICE O/P EST MOD 30 MIN: CPT | Performed by: INTERNAL MEDICINE

## 2022-08-10 PROCEDURE — 94726 PLETHYSMOGRAPHY LUNG VOLUMES: CPT | Performed by: INTERNAL MEDICINE

## 2022-08-10 RX ORDER — IPRATROPIUM BROMIDE AND ALBUTEROL SULFATE 2.5; .5 MG/3ML; MG/3ML
3 SOLUTION RESPIRATORY (INHALATION) EVERY 4 HOURS PRN
Qty: 120 ML | Refills: 11 | Status: SHIPPED | OUTPATIENT
Start: 2022-08-10 | End: 2023-01-06 | Stop reason: SDUPTHER

## 2022-08-10 ASSESSMENT — PULMONARY FUNCTION TESTS
FVC_PERCENT_PREDICTED: 58
FEV1/FVC_PERCENT_LLN: 67
FVC: 1.86
FEV1_PERCENT_CHANGE: 4
FEV1/FVC_PERCENT_CHANGE: 175
FEV1/FVC: 61
FEV1/FVC: 63
FVC_PREDICTED: 3.19
FEV1_PERCENT_PREDICTED: 45
FEV1/FVC_PERCENT_PREDICTED: 78
FEV1_LLN: 2.14
FEV1_PERCENT_CHANGE: 7
FEV1/FVC: 63.44
FVC: 1.78
FEV1/FVC_PERCENT_PREDICTED: 78
FEV1: 1.18
FEV1/FVC: 61
FEV1_PREDICTED: 2.56
FEV1/FVC_PREDICTED: 81
FEV1_PERCENT_PREDICTED: 42
FEV1: 1.09
FEV1/FVC_PERCENT_PREDICTED: 80
FVC_LLN: 2.66
FVC_PERCENT_PREDICTED: 55
FEV1/FVC_PERCENT_PREDICTED: 76
FEV1/FVC_PERCENT_PREDICTED: 76
FEV1/FVC_PERCENT_CHANGE: 2

## 2022-08-10 NOTE — PROCEDURES
Technician: Rubia Pérez RRT, CPFT  Good patient effort & cooperation.  The results of this test meet the ATS/ERS standards for acceptability & reproducibility.  Test was performed on the Mobile Accord Body Plethysmograph-Elite DX system.  Predicted equations for Spirometry are GLI-2012, ITS for lung volumes, and GLI-2017 for DLCO.  The DLCO was uncorrected for Hgb.  A bronchodilator of Ventolin HFA -2puffs via spacer administered.  DLCO performed during dilation period.    1. Baseline spirometry demonstrates a severe reduction in FEV1 and FVC. FEV1/FVC ratio is reduced at 61%.  FEV1 is 1.09 L which is 42% of predicted.    2. After administration of an inhaled bronchodilator there is 7% improvement in FEV1.    3. Lung volumes demonstrate hyperinflation.    4. Gas exchange as estimated by DLCO is normal at 100% of predicted.    5. Airway resistance is increased.      Impression:    This study demonstrates the presence of severe obstructive lung disease.  Partial reversibility is noted on the study.

## 2022-08-11 NOTE — PROGRESS NOTES
Chief Complaint   Patient presents with    Results     PFT results.       HPI:  The patient is a 51-year-old woman with COPD/asthma.  The patient smoked about 1 pack of cigarettes per day for 20 years.  She said she smoked on and off.  She finally quit smoking in March 2021.  Patient has chronic shortness of breath with significant activity.  She does not complain of a chronic cough or sputum production.  She has never had hemoptysis.  She does admit to wheezing several times per week.  She has been living in [place] for the past several years and while there she was bothered with seasonal allergies.  She had no history of allergies as a child.    She tells me that albuterol has helped her when she gets short of breath and helps her to exercise.  She has been using Trelegy which helps and also uses a nebulizer twice daily.  She had spirometry performed at [facility] demonstrating an FEV1 of 0.84 l which was 32% of predicted.  Her FEV1 FVC ratio was 53%.  After a bronchodilator was administered her FEV1 improved to 1.03 L which was 42% of predicted.  Her flow volume loop seem to indicate good effort.  A chest CT performed at [facility] demonstrated some left linear atelectasis but was otherwise unremarkable.  There was no evidence of pulmonary embolism.  An echocardiogram showed no evidence of right ventricular dysfunction and no evidence of pulmonary hypertension.  Left ventricular function was normal.  Overnight oximetry did show hypoxemia which led to a sleep study.  The patient is now on CPAP at night and is tolerating the CPAP.  She was seen in the emergency room several times for hypertension which is now controlled with losartan and metoprolol.  Repeat pulmonary function testing confirms the prior testing with an FEV1 of 1.09 L which is 42% predicted and an FEV1 FVC ratio of 61%.  DLCO was normal at 100% predicted.  This is consistent with a diagnosis of COPD/asthma overlap.  She does complain of  Jael Rothman, patient's home care nurse, calling with concern for dermatitis underneath his G-tube. She reports a little raised area with milky drainage not from the G-tube. Please advise. shortness of breath with activity.    Past Medical History:   Diagnosis Date    Asthma     Bronchitis     Chickenpox     Difficulty swallowing     Hypertension     Palpitations     Shortness of breath     Sleep apnea     Tonsillitis     Wears glasses     Wheezing        ROS:   Constitutional: Denies fevers, chills, night sweats, fatigue or weight loss  Eyes: Denies vision loss, pain, drainage, double vision  Ears, Nose, Throat: Denies earache, tinnitus, hoarseness  Cardiovascular: Denies chest pain, tightness, palpitations at this time  Respiratory: See HPI  Sleep: On CPAP  GI: Denies abdominal pain, nausea, vomiting, diarrhea  : Denies frequent urination, hematuria, painful urination  Musculoskeletal: Denies back pain, painful joints, sore muscles  Neurological: Denies headaches, seizures  Skin: Denies rashes, color changes  Psychiatric: Denies depression or thoughts of suicide  Hematologic: Denies bleeding tendency or clotting tendency  Allergic/Immunologic: Denies rhinitis, skin sensitivity    Social History     Socioeconomic History    Marital status: Single     Spouse name: Not on file    Number of children: Not on file    Years of education: Not on file    Highest education level: Not on file   Occupational History    Not on file   Tobacco Use    Smoking status: Former     Packs/day: 1.00     Years: 20.00     Pack years: 20.00     Types: Cigarettes     Quit date: 3/14/2021     Years since quittin.4    Smokeless tobacco: Never    Tobacco comments:     quit on and off, most recently quit 2021   Vaping Use    Vaping Use: Former   Substance and Sexual Activity    Alcohol use: Not Currently     Alcohol/week: 0.0 oz    Drug use: Never    Sexual activity: Not on file   Other Topics Concern    Not on file   Social History Narrative    Not on file     Social Determinants of Health     Financial Resource Strain: Not on file   Food Insecurity: Not on file   Transportation Needs: Not on file   Physical  "Activity: Not on file   Stress: Not on file   Social Connections: Not on file   Intimate Partner Violence: Not on file   Housing Stability: Not on file     Lisinopril  Current Outpatient Medications on File Prior to Visit   Medication Sig Dispense Refill    metoprolol SR (TOPROL XL) 25 MG TABLET SR 24 HR Take 1 Tablet by mouth every day. MUST BE SEEN FOR FURTHER REFILLS 90 Tablet 0    TRELEGY ELLIPTA 100-62.5-25 MCG/INH AEROSOL POWDER, BREATH ACTIVATED inhalation INHALE ONE PUFF BY MOUTH ONE TIME DAILY      albuterol 108 (90 Base) MCG/ACT Aero Soln inhalation aerosol every 6 hours as needed.      hydrOXYzine pamoate (VISTARIL) 50 MG Cap Take 50 mg by mouth 4 times a day as needed.      ipratropium (ATROVENT) 0.03 % Solution Administer  into affected nostril(S) as needed.      losartan (COZAAR) 50 MG Tab Take 50 mg by mouth every day.      SPIRIVA RESPIMAT 1.25 MCG/ACT Aero Soln Inhale 2 puffs every day by inhalation route.      venlafaxine XR (EFFEXOR XR) 37.5 MG CAPSULE SR 24 HR Take 37.5 mg by mouth every day.       No current facility-administered medications on file prior to visit.     /74 (BP Location: Right arm, Patient Position: Sitting, BP Cuff Size: Adult)   Pulse 72   Ht 1.575 m (5' 2\")   Wt 111 kg (244 lb)   SpO2 92%   Family History   Problem Relation Age of Onset    Hyperlipidemia Father     Respiratory Disease Father     Heart Disease Sister     Alzheimer's Disease Paternal Grandmother     Hypertension Mother        Physical Exam:    HEENT: PERRLA, EOMI, no scleral icterus, no nasal or oral lesions  Neck: No thyromegaly, no adenopathy, no bruits  Mallampatti: Grade II  Lungs: Equal breath sounds, no wheezes or crackles  Heart: Regular rate and rhythm, no gallops or murmurs  Abdomen: Soft, benign, no organomegaly  Extremities: No clubbing, cyanosis, or edema  Neurologic: Cranial nerve, motor, and sensory exam are normal    1. Asthma-COPD overlap syndrome (HCC)    2. SOB (shortness of breath)  "   3. LEDA (obstructive sleep apnea)    4. Hypoxemia    5. Increased BMI        This woman has shortness of breath that is primarily due to to her obstructive lung disease.  In addition she also has an elevated BMI and is relatively deconditioned.  She is on metoprolol.    We have advised weight loss and increased exercise.  We have given her some information on an online pulmonary rehabilitation program (pulmonary lift).  We will change her nebulized medication from albuterol to DuoNeb.  She is also interested in obtaining a portable nebulizer.  I have asked her to discuss any alternatives to metoprolol with cardiology.  We will see her back in 6 months or sooner if she has issues.

## 2022-08-29 RX ORDER — METOPROLOL SUCCINATE 25 MG/1
TABLET, EXTENDED RELEASE ORAL
Qty: 90 TABLET | Refills: 0 | Status: SHIPPED | OUTPATIENT
Start: 2022-08-29 | End: 2022-12-05

## 2022-12-01 DIAGNOSIS — I49.3 PVC (PREMATURE VENTRICULAR CONTRACTION): ICD-10-CM

## 2022-12-01 NOTE — TELEPHONE ENCOUNTER
MEDICATION REFILL : VR    Caller: Katiana Simmons    Medication Name and Dosage:     METOPROLOL 25MG    Please call your pharmacy and have them send us a refill request or speak to a live representative, RX number may have changed.    Medication amount left: 2 WEEKS    Preferred Pharmacy:     [facility] - [place], [place] - [address]    Other questions (Topic): 1 WK SHORT    Patient states that she would like to get a short fill, as she will run out of this medication 1 week before her appointment with VR on 12/21. Please advise.    Thank you,  Felix RAMIREZ    Callback Number (Will only call for issues): [phone] (home)

## 2022-12-03 NOTE — TELEPHONE ENCOUNTER
Is the patient due for a refill? Yes    Was the patient seen the past year? Yes    Date of last office visit: 8/12/21    Does the patient have an upcoming appointment?  Yes   If yes, When? 12/21/22    Provider to refill:VR    Does the patients insurance require a 100 day supply?  No

## 2022-12-05 RX ORDER — METOPROLOL SUCCINATE 25 MG/1
25 TABLET, EXTENDED RELEASE ORAL DAILY
Qty: 30 TABLET | Refills: 0 | Status: SHIPPED | OUTPATIENT
Start: 2022-12-05

## 2022-12-21 ENCOUNTER — OFFICE VISIT (OUTPATIENT)
Dept: CARDIOLOGY | Facility: MEDICAL CENTER | Age: 52
End: 2022-12-21
Payer: COMMERCIAL

## 2022-12-21 VITALS
BODY MASS INDEX: 45.08 KG/M2 | RESPIRATION RATE: 16 BRPM | DIASTOLIC BLOOD PRESSURE: 82 MMHG | OXYGEN SATURATION: 92 % | HEIGHT: 62 IN | WEIGHT: 245 LBS | SYSTOLIC BLOOD PRESSURE: 130 MMHG | HEART RATE: 85 BPM

## 2022-12-21 DIAGNOSIS — I49.3 PVC (PREMATURE VENTRICULAR CONTRACTION): ICD-10-CM

## 2022-12-21 DIAGNOSIS — R93.1 AGATSTON CAC SCORE, <100: ICD-10-CM

## 2022-12-21 DIAGNOSIS — I10 ESSENTIAL HYPERTENSION, BENIGN: ICD-10-CM

## 2022-12-21 PROBLEM — I35.1 AORTIC REGURGITATION: Status: RESOLVED | Noted: 2021-04-14 | Resolved: 2022-12-21

## 2022-12-21 PROBLEM — Z91.89 10 YEAR RISK OF MI OR STROKE < 7.5%: Status: RESOLVED | Noted: 2021-04-14 | Resolved: 2022-12-21

## 2022-12-21 PROCEDURE — 99214 OFFICE O/P EST MOD 30 MIN: CPT | Performed by: INTERNAL MEDICINE

## 2022-12-21 ASSESSMENT — ENCOUNTER SYMPTOMS
PND: 0
DECREASED APPETITE: 0
VOMITING: 0
BLURRED VISION: 0
NEAR-SYNCOPE: 0
CONSTIPATION: 0
DIARRHEA: 0
HEARTBURN: 0
DYSPNEA ON EXERTION: 0
WEIGHT LOSS: 0
IRREGULAR HEARTBEAT: 0
SHORTNESS OF BREATH: 0
COUGH: 0
BACK PAIN: 0
ABDOMINAL PAIN: 0
DEPRESSION: 0
CLAUDICATION: 0
SYNCOPE: 0
ALTERED MENTAL STATUS: 0
WEIGHT GAIN: 0
FLANK PAIN: 0
ORTHOPNEA: 0
NAUSEA: 0
DIZZINESS: 0
FEVER: 0
PALPITATIONS: 0

## 2022-12-21 NOTE — PROGRESS NOTES
Cardiology Note    Chief Complaint   Patient presents with    Chest Pain    Hypertension    Palpitations       History of Present Illness: Katiana Simmons is a 52 y.o. female PMH HTN, tobacco use, severe reactive airway disease and COPD on PFTs who presents for follow up visit.    Doing well this visit. No active cardiac complaints. She attempted intermittent fasting and initially lost 25 lbs. Recalls how good she felt and had reduced need for rescue inhaler and had more energy etc. Unfortunately on vacation gained most of it back and feeling poorly again. No cardiac complaints especially no palpitations, angina, nor heart failure symptoms. Compliant with medications and denies adverse effects.     Previously noted: Diagnosed with copd around 4/2021 she states. Underwent PFTs at [facility]. Found sleep apnea as well.     ED 3/3/21 visit for palpitations MG. Found hypertensive 180/107 and sinus tachy as well as hypoxia 90-91%. ddimer was elevated but CTA negative for PE. Improved with BP control using nitroglycerin and IV metoprolol. Troponin negative at the time. Case was discussed with cardiology Dr Davis at that point.     Review of Systems   Constitutional: Negative for decreased appetite, fever, malaise/fatigue, weight gain and weight loss.   HENT:  Negative for congestion and nosebleeds.    Eyes:  Negative for blurred vision.   Cardiovascular:  Negative for chest pain, claudication, dyspnea on exertion, irregular heartbeat, leg swelling, near-syncope, orthopnea, palpitations, paroxysmal nocturnal dyspnea and syncope.   Respiratory:  Negative for cough and shortness of breath.    Endocrine: Negative for cold intolerance and heat intolerance.   Skin:  Negative for rash.   Musculoskeletal:  Negative for back pain.   Gastrointestinal:  Negative for abdominal pain, constipation, diarrhea, heartburn, melena, nausea and vomiting.   Genitourinary:  Negative for dysuria, flank pain and hematuria.   Neurological:  Negative  for dizziness.   Psychiatric/Behavioral:  Negative for altered mental status and depression.        Past Medical History:   Diagnosis Date    Asthma     Bronchitis     Chickenpox     Difficulty swallowing     Hypertension     Palpitations     Shortness of breath     Sleep apnea     Tonsillitis     Wears glasses     Wheezing          Past Surgical History:   Procedure Laterality Date    HYSTERECTOMY LAPAROSCOPY      PRIMARY C SECTION      TONSILLECTOMY           Current Outpatient Medications   Medication Sig Dispense Refill    metoprolol SR (TOPROL XL) 25 MG TABLET SR 24 HR Take 1 Tablet by mouth every day. 30 Tablet 0    ipratropium-albuterol (DUONEB) 0.5-2.5 (3) MG/3ML nebulizer solution Take 3 mL by nebulization every four hours as needed for Shortness of Breath. 120 mL 11    TRELEGY ELLIPTA 100-62.5-25 MCG/INH AEROSOL POWDER, BREATH ACTIVATED inhalation INHALE ONE PUFF BY MOUTH ONE TIME DAILY      albuterol 108 (90 Base) MCG/ACT Aero Soln inhalation aerosol every 6 hours as needed.      hydrOXYzine pamoate (VISTARIL) 50 MG Cap Take 50 mg by mouth 4 times a day as needed.      losartan (COZAAR) 50 MG Tab Take 50 mg by mouth every day.       No current facility-administered medications for this visit.         Allergies   Allergen Reactions    Lisinopril Unspecified     Turned red, bp increased rapidly         Family History   Problem Relation Age of Onset    Hyperlipidemia Father     Respiratory Disease Father     Heart Disease Sister     Alzheimer's Disease Paternal Grandmother     Hypertension Mother          Social History     Socioeconomic History    Marital status: Single     Spouse name: Not on file    Number of children: Not on file    Years of education: Not on file    Highest education level: Not on file   Occupational History    Not on file   Tobacco Use    Smoking status: Former     Packs/day: 1.00     Years: 20.00     Pack years: 20.00     Types: Cigarettes     Quit date: 3/14/2021     Years since  "quittin.7    Smokeless tobacco: Never    Tobacco comments:     quit on and off, most recently quit 2021   Vaping Use    Vaping Use: Former   Substance and Sexual Activity    Alcohol use: Not Currently     Alcohol/week: 0.0 oz    Drug use: Never    Sexual activity: Not on file   Other Topics Concern    Not on file   Social History Narrative    Not on file     Social Determinants of Health     Financial Resource Strain: Not on file   Food Insecurity: Not on file   Transportation Needs: Not on file   Physical Activity: Not on file   Stress: Not on file   Social Connections: Not on file   Intimate Partner Violence: Not on file   Housing Stability: Not on file         Physical Exam:  Ambulatory Vitals  /82 (BP Location: Left arm, Patient Position: Sitting, BP Cuff Size: Adult)   Pulse 85   Resp 16   Ht 1.575 m (5' 2\")   Wt 111 kg (245 lb)   SpO2 92%    BP Readings from Last 4 Encounters:   22 130/82   08/10/22 126/74   22 110/74   21 110/72     Weight/BMI:   Vitals:    22 0806   BP: 130/82   Weight: 111 kg (245 lb)   Height: 1.575 m (5' 2\")    Body mass index is 44.81 kg/m².  Wt Readings from Last 4 Encounters:   22 111 kg (245 lb)   08/10/22 111 kg (244 lb)   08/10/22 111 kg (244 lb)   22 108 kg (239 lb 2 oz)       Physical Exam  Constitutional:       General: She is not in acute distress.  HENT:      Head: Normocephalic and atraumatic.   Eyes:      Conjunctiva/sclera: Conjunctivae normal.      Pupils: Pupils are equal, round, and reactive to light.   Neck:      Vascular: No JVD.   Cardiovascular:      Rate and Rhythm: Normal rate and regular rhythm.      Heart sounds: Normal heart sounds. No murmur heard.    No friction rub. No gallop.   Pulmonary:      Effort: Pulmonary effort is normal. No respiratory distress.      Breath sounds: Normal breath sounds. No wheezing or rales.   Chest:      Chest wall: No tenderness.   Abdominal:      General: Bowel sounds are " normal. There is no distension.      Palpations: Abdomen is soft.   Musculoskeletal:      Cervical back: Normal range of motion and neck supple.   Skin:     General: Skin is warm and dry.   Neurological:      Mental Status: She is alert and oriented to person, place, and time.   Psychiatric:         Mood and Affect: Affect normal.         Judgment: Judgment normal.       Lab Data Review:  No results found for: CHOLSTRLTOT, LDL, HDL, TRIGLYCERIDE    No results found for: SODIUM, POTASSIUM, CHLORIDE, CO2, GLUCOSE, BUN, CREATININE, BUNCREATRAT, GLOMRATE  CrCl cannot be calculated (No successful lab value found.).  No results found for: ALKPHOSPHAT, ASTSGOT, ALTSGPT, TBILIRUBIN   No results found for: WBC, HCT  No results found for: HBA1C  No components found for: TROP    Outside labs [facility]  -tot chol 173, trig 107, HDL 47,     Cardiac Imaging and Procedures Review:      EKG outside study [facility] 4/1/2021 - sinus, frequent PVCs    TTE 3/4/2021 [facility]  -normal LV, normal RV  -mild to mod AR [ probably closer to moderate]  -estimated RVSP 40mmHg    holter 2/4/21 24h [facility]  -ventricular ectopy 4.9%  -rare supraventricular ectopy  -predominantly sinus rhythm    CCTA 8/9/21  Coronary calcification:  LMA - 0.0  LCX - 0.0  LAD - 0.0  RCA - 0.0  PDA - 0.0  Total Calcium Score: 0.0  Percentile: Calcium score is below the 25th percentile for the patient's age and sex.  Coronary CTA:  Dominance: Right dominant circulation.  Left Main: Large caliber vessel with a normal takeoff from the left coronary cusp that bifurcates to form a left anterior descending artery and a left circumflex artery. No plaque or stenosis.  LAD and Diagonals: Patent. No plaque or stenosis. No ramus intermedius.  LCX and Obtuse Marginals: Patent. No plaque or stenosis.  RCA, Posterior Descending and Posterolateral Branches: Patent. No plaque or stenosis.  Cardiac Structure and Morphology:  Left atrium: Normal in size. No thrombus in the left atrial  appendage.  Left ventricle: Normal in size. No stigmata of prior infarction. No abnormal filling defect.  Pericardium: Normal thickness. No pericardial effusion or calcification.  Cardiac valves: No thickening or calcifications in the aortic or mitral valves.  Aorta: 3.9 cm ascending aortic ectasia. No aneurysm.  1. Calcium score of 0.  2. No evidence of coronary plaque or coronary artery stenosis.  3. A 3.9 cm ascending aortic ectasia.  4. Scattered areas of atelectasis/scarring throughout the lungs.    TTE 10/2021 [facility] [place] interpreted by  To  -normal LVEF, normal RV, no evidence valvular abnormality based on doppler, estimated RVSP 30 mmHg.     Medical Decision Making:  Problem List Items Addressed This Visit       Essential hypertension, benign    PVC (premature ventricular contraction)    Agatston CAC score, <100     Continue lifestyle modifications with weight loss and exercise for every 6 months goal sustained 5% weight loss with long term goal normal BMI range. If should deviate consider bariatric surgery.     PVCs / palpitations - continue metoprolol.     AR - stable on repeat TTE    It was my pleasure to meet with Ms. Simmons.

## 2023-01-06 RX ORDER — IPRATROPIUM BROMIDE AND ALBUTEROL SULFATE 2.5; .5 MG/3ML; MG/3ML
3 SOLUTION RESPIRATORY (INHALATION) EVERY 4 HOURS PRN
Qty: 120 ML | Refills: 11 | Status: SHIPPED | OUTPATIENT
Start: 2023-01-06

## 2023-01-06 NOTE — TELEPHONE ENCOUNTER
SB  Caller: Katiana Simmons    Medication Name and Dosage:  3 mL by nebulization every four hours as needed for Shortness of Breath.     Please call your pharmacy and have them send us a refill request or speak to a live representative, RX number may have changed.    Medication amount left: 2 BOXES    Preferred Pharmacy: [facility]    Other questions (Topic): N/A    Callback Number (Will only call for issues): [phone]    Thank You  S.C